# Patient Record
Sex: MALE | Race: WHITE | NOT HISPANIC OR LATINO | URBAN - METROPOLITAN AREA
[De-identification: names, ages, dates, MRNs, and addresses within clinical notes are randomized per-mention and may not be internally consistent; named-entity substitution may affect disease eponyms.]

---

## 2020-07-25 ENCOUNTER — TELEPHONE ENCOUNTER (OUTPATIENT)
Dept: URBAN - METROPOLITAN AREA CLINIC 13 | Facility: CLINIC | Age: 85
End: 2020-07-25

## 2020-07-26 ENCOUNTER — TELEPHONE ENCOUNTER (OUTPATIENT)
Dept: URBAN - METROPOLITAN AREA CLINIC 13 | Facility: CLINIC | Age: 85
End: 2020-07-26

## 2020-07-26 RX ORDER — SULFAMETHOXAZOLE AND TRIMETHOPRIM 800; 160 MG/1; MG/1
TABLET ORAL
Qty: 20 | Refills: 0 | Status: ACTIVE | COMMUNITY
Start: 2019-04-15

## 2020-07-26 RX ORDER — FOLIC ACID 1 MG/1
TAKE 1 TABLET BY MOUTH EVERY DAY TABLET ORAL
Qty: 30 | Refills: 0 | Status: ACTIVE | COMMUNITY
Start: 2018-09-10

## 2020-07-26 RX ORDER — ATORVASTATIN CALCIUM 20 MG/1
TAKE 1 TABLET AT BEDTIME TABLET, FILM COATED ORAL
Refills: 0 | Status: ACTIVE | COMMUNITY
Start: 2019-01-06

## 2020-07-26 RX ORDER — TRAMADOL HYDROCHLORIDE 50 MG/1
TAKE 1 TABLET BY MOUTH EVERY 6 HOURS AS NEEDED FOR PAIN TABLET ORAL
Qty: 20 | Refills: 0 | Status: ACTIVE | COMMUNITY
Start: 2019-04-25

## 2020-07-26 RX ORDER — OXYCODONE AND ACETAMINOPHEN 10; 325 MG/1; MG/1
TABLET ORAL
Qty: 60 | Refills: 0 | Status: ACTIVE | COMMUNITY
Start: 2019-05-01

## 2020-07-26 RX ORDER — PANTOPRAZOLE SODIUM 40 MG/1
TAKE ONE TABLET BY MOUTH TWICE A DAY TABLET, DELAYED RELEASE ORAL
Qty: 60 | Refills: 1 | Status: ACTIVE | COMMUNITY
Start: 2019-06-21

## 2020-07-26 RX ORDER — METHYLPREDNISOLONE 4 MG/1
TABLET ORAL
Qty: 21 | Refills: 0 | Status: ACTIVE | COMMUNITY
Start: 2019-04-26

## 2020-07-26 RX ORDER — OXYCODONE HYDROCHLORIDE 5 MG/1
TABLET ORAL
Qty: 12 | Refills: 0 | Status: ACTIVE | COMMUNITY
Start: 2019-04-26

## 2020-07-26 RX ORDER — ALLOPURINOL 300 MG/1
TAKE 1 TABLET DAILY TABLET ORAL
Refills: 0 | Status: ACTIVE | COMMUNITY
Start: 2019-04-03

## 2020-07-26 RX ORDER — POTASSIUM CHLORIDE 750 MG/1
TAKE 1 TABLET BY MOUTH EVERY DAY TABLET, FILM COATED, EXTENDED RELEASE ORAL
Qty: 30 | Refills: 0 | Status: ACTIVE | COMMUNITY
Start: 2018-10-02

## 2020-07-26 RX ORDER — FUROSEMIDE 40 MG/1
TAKE 1 TABLET BY MOUTH EVERY DAY TABLET ORAL
Qty: 90 | Refills: 0 | Status: ACTIVE | COMMUNITY
Start: 2018-12-10

## 2020-07-26 RX ORDER — MUPIROCIN 20 MG/G
PPLY TO BOTH NOSTRILS 3 TIMES A DAY OINTMENT TOPICAL
Qty: 22 | Refills: 0 | Status: ACTIVE | COMMUNITY
Start: 2018-10-30

## 2020-07-26 RX ORDER — LOSARTAN POTASSIUM 50 MG/1
TAKE 1 TABLET DAILY TABLET, FILM COATED ORAL
Refills: 0 | Status: ACTIVE | COMMUNITY
Start: 2019-02-06

## 2020-07-26 RX ORDER — HYDROCODONE BITARTRATE AND ACETAMINOPHEN 5; 325 MG/1; MG/1
TAKE ONE TABLET EVERY 6 HOURS AS NEEDED TABLET ORAL
Qty: 15 | Refills: 0 | Status: ACTIVE | COMMUNITY
Start: 2018-10-23

## 2020-07-26 RX ORDER — CLOTRIMAZOLE AND BETAMETHASONE DIPROPIONATE 10; .5 MG/G; MG/G
APPLY TO AFFECTED AREA TWICE A DAY FOR 2 WEEKS CREAM TOPICAL
Qty: 30 | Refills: 0 | Status: ACTIVE | COMMUNITY
Start: 2018-10-12

## 2020-07-26 RX ORDER — POTASSIUM CHLORIDE 750 MG/1
TAKE 1 TABLET BY MOUTH EVERY DAY TABLET, FILM COATED, EXTENDED RELEASE ORAL
Qty: 90 | Refills: 0 | Status: ACTIVE | COMMUNITY
Start: 2018-12-10

## 2020-07-26 RX ORDER — NITROGLYCERIN 0.4 MG/1
PLACE 1 TABLET UNDER THE TONGUE EVERY 5 MINUTES FOR UP TO 3 DOSES AS NEEDED FOR CHEST PAIN.CALL 911 IF PAIN PERSISTS TABLET SUBLINGUAL
Refills: 0 | Status: ACTIVE | COMMUNITY
Start: 2019-05-02

## 2020-07-26 RX ORDER — CLINDAMYCIN HYDROCHLORIDE 300 MG/1
TAKE ONE CAPSULE BY MOUTH 3 TIMES A DAY FOR 7 DAYS CAPSULE ORAL
Qty: 20 | Refills: 0 | Status: ACTIVE | COMMUNITY
Start: 2018-10-30

## 2020-07-26 RX ORDER — AMOXICILLIN 500 MG/1
TAKE ONE CAPSULE EVERY 12 HOURS FOR 10 DAYS CAPSULE ORAL
Qty: 20 | Refills: 0 | Status: ACTIVE | COMMUNITY
Start: 2018-07-16

## 2020-07-26 RX ORDER — CYCLOBENZAPRINE HYDROCHLORIDE 10 MG/1
TAKE 1 TABLET BY MOUTH 3 TIMES A DAY AS NEEDED FOR MUSCLE SPASMS TABLET, FILM COATED ORAL
Qty: 15 | Refills: 0 | Status: ACTIVE | COMMUNITY
Start: 2019-04-26

## 2020-07-26 RX ORDER — FUROSEMIDE 40 MG/1
TAKE 1 TABLET BY MOUTH EVERY DAY TABLET ORAL
Qty: 30 | Refills: 0 | Status: ACTIVE | COMMUNITY
Start: 2018-10-02

## 2020-07-26 RX ORDER — HYDROCODONE BITARTRATE AND ACETAMINOPHEN 10; 325 MG/1; MG/1
TABLET ORAL
Qty: 30 | Refills: 0 | Status: ACTIVE | COMMUNITY
Start: 2019-05-24

## 2020-07-26 RX ORDER — CARVEDILOL 6.25 MG/1
TAKE 1 TABLET TWICE DAILY WITH MEALS TABLET, FILM COATED ORAL
Refills: 0 | Status: ACTIVE | COMMUNITY
Start: 2019-02-06

## 2020-08-12 ENCOUNTER — TELEPHONE ENCOUNTER (OUTPATIENT)
Dept: URBAN - METROPOLITAN AREA CLINIC 113 | Facility: CLINIC | Age: 85
End: 2020-08-12

## 2020-08-15 ENCOUNTER — LAB OUTSIDE AN ENCOUNTER (OUTPATIENT)
Dept: URBAN - METROPOLITAN AREA CLINIC 113 | Facility: CLINIC | Age: 85
End: 2020-08-15

## 2020-08-18 ENCOUNTER — OFFICE VISIT (OUTPATIENT)
Dept: URBAN - METROPOLITAN AREA MEDICAL CENTER 2 | Facility: MEDICAL CENTER | Age: 85
End: 2020-08-18
Payer: MEDICARE

## 2020-08-18 ENCOUNTER — TELEPHONE ENCOUNTER (OUTPATIENT)
Dept: URBAN - METROPOLITAN AREA CLINIC 113 | Facility: CLINIC | Age: 85
End: 2020-08-18

## 2020-08-18 DIAGNOSIS — K22.2 ESOPHAGEAL OBSTRUCTION: ICD-10-CM

## 2020-08-18 DIAGNOSIS — K44.9 HIATAL HERNIA: ICD-10-CM

## 2020-08-18 PROCEDURE — 43248 EGD GUIDE WIRE INSERTION: CPT | Performed by: INTERNAL MEDICINE

## 2020-08-18 RX ORDER — FUROSEMIDE 40 MG/1
TAKE 1 TABLET BY MOUTH EVERY DAY TABLET ORAL
Qty: 30 | Refills: 0 | Status: ACTIVE | COMMUNITY
Start: 2018-10-02

## 2020-08-18 RX ORDER — HYDROCODONE BITARTRATE AND ACETAMINOPHEN 10; 325 MG/1; MG/1
TABLET ORAL
Qty: 30 | Refills: 0 | Status: ACTIVE | COMMUNITY
Start: 2019-05-24

## 2020-08-18 RX ORDER — MUPIROCIN 20 MG/G
PPLY TO BOTH NOSTRILS 3 TIMES A DAY OINTMENT TOPICAL
Qty: 22 | Refills: 0 | Status: ACTIVE | COMMUNITY
Start: 2018-10-30

## 2020-08-18 RX ORDER — METHYLPREDNISOLONE 4 MG/1
TABLET ORAL
Qty: 21 | Refills: 0 | Status: ACTIVE | COMMUNITY
Start: 2019-04-26

## 2020-08-18 RX ORDER — CYCLOBENZAPRINE HYDROCHLORIDE 10 MG/1
TAKE 1 TABLET BY MOUTH 3 TIMES A DAY AS NEEDED FOR MUSCLE SPASMS TABLET, FILM COATED ORAL
Qty: 15 | Refills: 0 | Status: ACTIVE | COMMUNITY
Start: 2019-04-26

## 2020-08-18 RX ORDER — POTASSIUM CHLORIDE 750 MG/1
TAKE 1 TABLET BY MOUTH EVERY DAY TABLET, FILM COATED, EXTENDED RELEASE ORAL
Qty: 30 | Refills: 0 | Status: ACTIVE | COMMUNITY
Start: 2018-10-02

## 2020-08-18 RX ORDER — LOSARTAN POTASSIUM 50 MG/1
TAKE 1 TABLET DAILY TABLET, FILM COATED ORAL
Refills: 0 | Status: ACTIVE | COMMUNITY
Start: 2019-02-06

## 2020-08-18 RX ORDER — TRAMADOL HYDROCHLORIDE 50 MG/1
TAKE 1 TABLET BY MOUTH EVERY 6 HOURS AS NEEDED FOR PAIN TABLET ORAL
Qty: 20 | Refills: 0 | Status: ACTIVE | COMMUNITY
Start: 2019-04-25

## 2020-08-18 RX ORDER — HYDROCODONE BITARTRATE AND ACETAMINOPHEN 5; 325 MG/1; MG/1
TAKE ONE TABLET EVERY 6 HOURS AS NEEDED TABLET ORAL
Qty: 15 | Refills: 0 | Status: ACTIVE | COMMUNITY
Start: 2018-10-23

## 2020-08-18 RX ORDER — CARVEDILOL 6.25 MG/1
TAKE 1 TABLET TWICE DAILY WITH MEALS TABLET, FILM COATED ORAL
Refills: 0 | Status: ACTIVE | COMMUNITY
Start: 2019-02-06

## 2020-08-18 RX ORDER — NITROGLYCERIN 0.4 MG/1
PLACE 1 TABLET UNDER THE TONGUE EVERY 5 MINUTES FOR UP TO 3 DOSES AS NEEDED FOR CHEST PAIN.CALL 911 IF PAIN PERSISTS TABLET SUBLINGUAL
Refills: 0 | Status: ACTIVE | COMMUNITY
Start: 2019-05-02

## 2020-08-18 RX ORDER — CLINDAMYCIN HYDROCHLORIDE 300 MG/1
TAKE ONE CAPSULE BY MOUTH 3 TIMES A DAY FOR 7 DAYS CAPSULE ORAL
Qty: 20 | Refills: 0 | Status: ACTIVE | COMMUNITY
Start: 2018-10-30

## 2020-08-18 RX ORDER — OXYCODONE AND ACETAMINOPHEN 10; 325 MG/1; MG/1
TABLET ORAL
Qty: 60 | Refills: 0 | Status: ACTIVE | COMMUNITY
Start: 2019-05-01

## 2020-08-18 RX ORDER — AMOXICILLIN 500 MG/1
TAKE ONE CAPSULE EVERY 12 HOURS FOR 10 DAYS CAPSULE ORAL
Qty: 20 | Refills: 0 | Status: ACTIVE | COMMUNITY
Start: 2018-07-16

## 2020-08-18 RX ORDER — OXYCODONE HYDROCHLORIDE 5 MG/1
TABLET ORAL
Qty: 12 | Refills: 0 | Status: ACTIVE | COMMUNITY
Start: 2019-04-26

## 2020-08-18 RX ORDER — SULFAMETHOXAZOLE AND TRIMETHOPRIM 800; 160 MG/1; MG/1
TABLET ORAL
Qty: 20 | Refills: 0 | Status: ACTIVE | COMMUNITY
Start: 2019-04-15

## 2020-08-18 RX ORDER — PANTOPRAZOLE SODIUM 40 MG/1
TAKE ONE TABLET BY MOUTH TWICE A DAY TABLET, DELAYED RELEASE ORAL
Qty: 60 | Refills: 1 | Status: ACTIVE | COMMUNITY
Start: 2019-06-21

## 2020-08-18 RX ORDER — ALLOPURINOL 300 MG/1
TAKE 1 TABLET DAILY TABLET ORAL
Refills: 0 | Status: ACTIVE | COMMUNITY
Start: 2019-04-03

## 2020-08-18 RX ORDER — FOLIC ACID 1 MG/1
TAKE 1 TABLET BY MOUTH EVERY DAY TABLET ORAL
Qty: 30 | Refills: 0 | Status: ACTIVE | COMMUNITY
Start: 2018-09-10

## 2020-08-18 RX ORDER — CLOTRIMAZOLE AND BETAMETHASONE DIPROPIONATE 10; .5 MG/G; MG/G
APPLY TO AFFECTED AREA TWICE A DAY FOR 2 WEEKS CREAM TOPICAL
Qty: 30 | Refills: 0 | Status: ACTIVE | COMMUNITY
Start: 2018-10-12

## 2020-08-18 RX ORDER — ATORVASTATIN CALCIUM 20 MG/1
TAKE 1 TABLET AT BEDTIME TABLET, FILM COATED ORAL
Refills: 0 | Status: ACTIVE | COMMUNITY
Start: 2019-01-06

## 2020-08-24 ENCOUNTER — TELEPHONE ENCOUNTER (OUTPATIENT)
Dept: URBAN - METROPOLITAN AREA CLINIC 113 | Facility: CLINIC | Age: 85
End: 2020-08-24

## 2020-11-17 ENCOUNTER — OFFICE VISIT (OUTPATIENT)
Dept: URBAN - METROPOLITAN AREA CLINIC 113 | Facility: CLINIC | Age: 85
End: 2020-11-17

## 2020-12-14 ENCOUNTER — TELEPHONE ENCOUNTER (OUTPATIENT)
Dept: URBAN - METROPOLITAN AREA CLINIC 113 | Facility: CLINIC | Age: 85
End: 2020-12-14

## 2020-12-15 ENCOUNTER — LAB OUTSIDE AN ENCOUNTER (OUTPATIENT)
Dept: URBAN - METROPOLITAN AREA CLINIC 113 | Facility: CLINIC | Age: 85
End: 2020-12-15

## 2020-12-21 ENCOUNTER — OFFICE VISIT (OUTPATIENT)
Dept: URBAN - METROPOLITAN AREA MEDICAL CENTER 19 | Facility: MEDICAL CENTER | Age: 85
End: 2020-12-21
Payer: MEDICARE

## 2020-12-21 DIAGNOSIS — K22.2 BENIGN ESOPHAGEAL STRICTURE: ICD-10-CM

## 2020-12-21 DIAGNOSIS — K44.9 DIAPHRAGMATIC HERNIA: ICD-10-CM

## 2020-12-21 DIAGNOSIS — R13.10 ABNORMAL SWALLOWING: ICD-10-CM

## 2020-12-21 PROCEDURE — 43248 EGD GUIDE WIRE INSERTION: CPT | Performed by: INTERNAL MEDICINE

## 2020-12-21 RX ORDER — AMOXICILLIN 500 MG/1
TAKE ONE CAPSULE EVERY 12 HOURS FOR 10 DAYS CAPSULE ORAL
Qty: 20 | Refills: 0 | Status: ACTIVE | COMMUNITY
Start: 2018-07-16

## 2020-12-21 RX ORDER — TRAMADOL HYDROCHLORIDE 50 MG/1
TAKE 1 TABLET BY MOUTH EVERY 6 HOURS AS NEEDED FOR PAIN TABLET ORAL
Qty: 20 | Refills: 0 | Status: ACTIVE | COMMUNITY
Start: 2019-04-25

## 2020-12-21 RX ORDER — POTASSIUM CHLORIDE 750 MG/1
TAKE 1 TABLET BY MOUTH EVERY DAY TABLET, FILM COATED, EXTENDED RELEASE ORAL
Qty: 30 | Refills: 0 | Status: ACTIVE | COMMUNITY
Start: 2018-10-02

## 2020-12-21 RX ORDER — HYDROCODONE BITARTRATE AND ACETAMINOPHEN 10; 325 MG/1; MG/1
TABLET ORAL
Qty: 30 | Refills: 0 | Status: ACTIVE | COMMUNITY
Start: 2019-05-24

## 2020-12-21 RX ORDER — MUPIROCIN 20 MG/G
PPLY TO BOTH NOSTRILS 3 TIMES A DAY OINTMENT TOPICAL
Qty: 22 | Refills: 0 | Status: ACTIVE | COMMUNITY
Start: 2018-10-30

## 2020-12-21 RX ORDER — LOSARTAN POTASSIUM 50 MG/1
TAKE 1 TABLET DAILY TABLET, FILM COATED ORAL
Refills: 0 | Status: ACTIVE | COMMUNITY
Start: 2019-02-06

## 2020-12-21 RX ORDER — FUROSEMIDE 40 MG/1
TAKE 1 TABLET BY MOUTH EVERY DAY TABLET ORAL
Qty: 30 | Refills: 0 | Status: ACTIVE | COMMUNITY
Start: 2018-10-02

## 2020-12-21 RX ORDER — METHYLPREDNISOLONE 4 MG/1
TABLET ORAL
Qty: 21 | Refills: 0 | Status: ACTIVE | COMMUNITY
Start: 2019-04-26

## 2020-12-21 RX ORDER — CLOTRIMAZOLE AND BETAMETHASONE DIPROPIONATE 10; .5 MG/G; MG/G
APPLY TO AFFECTED AREA TWICE A DAY FOR 2 WEEKS CREAM TOPICAL
Qty: 30 | Refills: 0 | Status: ACTIVE | COMMUNITY
Start: 2018-10-12

## 2020-12-21 RX ORDER — ATORVASTATIN CALCIUM 20 MG/1
TAKE 1 TABLET AT BEDTIME TABLET, FILM COATED ORAL
Refills: 0 | Status: ACTIVE | COMMUNITY
Start: 2019-01-06

## 2020-12-21 RX ORDER — SULFAMETHOXAZOLE AND TRIMETHOPRIM 800; 160 MG/1; MG/1
TABLET ORAL
Qty: 20 | Refills: 0 | Status: ACTIVE | COMMUNITY
Start: 2019-04-15

## 2020-12-21 RX ORDER — HYDROCODONE BITARTRATE AND ACETAMINOPHEN 5; 325 MG/1; MG/1
TAKE ONE TABLET EVERY 6 HOURS AS NEEDED TABLET ORAL
Qty: 15 | Refills: 0 | Status: ACTIVE | COMMUNITY
Start: 2018-10-23

## 2020-12-21 RX ORDER — NITROGLYCERIN 0.4 MG/1
PLACE 1 TABLET UNDER THE TONGUE EVERY 5 MINUTES FOR UP TO 3 DOSES AS NEEDED FOR CHEST PAIN.CALL 911 IF PAIN PERSISTS TABLET SUBLINGUAL
Refills: 0 | Status: ACTIVE | COMMUNITY
Start: 2019-05-02

## 2020-12-21 RX ORDER — OXYCODONE HYDROCHLORIDE 5 MG/1
TABLET ORAL
Qty: 12 | Refills: 0 | Status: ACTIVE | COMMUNITY
Start: 2019-04-26

## 2020-12-21 RX ORDER — PANTOPRAZOLE SODIUM 40 MG/1
1 TABLET TABLET, DELAYED RELEASE ORAL
Qty: 60 | Refills: 3 | OUTPATIENT
Start: 2020-12-21

## 2020-12-21 RX ORDER — ALLOPURINOL 300 MG/1
TAKE 1 TABLET DAILY TABLET ORAL
Refills: 0 | Status: ACTIVE | COMMUNITY
Start: 2019-04-03

## 2020-12-21 RX ORDER — OXYCODONE AND ACETAMINOPHEN 10; 325 MG/1; MG/1
TABLET ORAL
Qty: 60 | Refills: 0 | Status: ACTIVE | COMMUNITY
Start: 2019-05-01

## 2020-12-21 RX ORDER — FOLIC ACID 1 MG/1
TAKE 1 TABLET BY MOUTH EVERY DAY TABLET ORAL
Qty: 30 | Refills: 0 | Status: ACTIVE | COMMUNITY
Start: 2018-09-10

## 2020-12-21 RX ORDER — CLINDAMYCIN HYDROCHLORIDE 300 MG/1
TAKE ONE CAPSULE BY MOUTH 3 TIMES A DAY FOR 7 DAYS CAPSULE ORAL
Qty: 20 | Refills: 0 | Status: ACTIVE | COMMUNITY
Start: 2018-10-30

## 2020-12-21 RX ORDER — PANTOPRAZOLE SODIUM 40 MG/1
TAKE ONE TABLET BY MOUTH TWICE A DAY TABLET, DELAYED RELEASE ORAL
Qty: 60 | Refills: 1 | Status: ACTIVE | COMMUNITY
Start: 2019-06-21

## 2020-12-21 RX ORDER — CYCLOBENZAPRINE HYDROCHLORIDE 10 MG/1
TAKE 1 TABLET BY MOUTH 3 TIMES A DAY AS NEEDED FOR MUSCLE SPASMS TABLET, FILM COATED ORAL
Qty: 15 | Refills: 0 | Status: ACTIVE | COMMUNITY
Start: 2019-04-26

## 2020-12-21 RX ORDER — CARVEDILOL 6.25 MG/1
TAKE 1 TABLET TWICE DAILY WITH MEALS TABLET, FILM COATED ORAL
Refills: 0 | Status: ACTIVE | COMMUNITY
Start: 2019-02-06

## 2020-12-27 ENCOUNTER — TELEPHONE ENCOUNTER (OUTPATIENT)
Dept: URBAN - METROPOLITAN AREA CLINIC 113 | Facility: CLINIC | Age: 85
End: 2020-12-27

## 2021-02-03 ENCOUNTER — OFFICE VISIT (OUTPATIENT)
Dept: URBAN - METROPOLITAN AREA CLINIC 113 | Facility: CLINIC | Age: 86
End: 2021-02-03
Payer: MEDICARE

## 2021-02-03 VITALS
HEART RATE: 68 BPM | BODY MASS INDEX: 27.99 KG/M2 | RESPIRATION RATE: 18 BRPM | DIASTOLIC BLOOD PRESSURE: 74 MMHG | TEMPERATURE: 98.2 F | HEIGHT: 69 IN | WEIGHT: 189 LBS | SYSTOLIC BLOOD PRESSURE: 138 MMHG

## 2021-02-03 DIAGNOSIS — K22.2 BENIGN ESOPHAGEAL STRICTURE: ICD-10-CM

## 2021-02-03 DIAGNOSIS — R93.5 ABNORMAL CT OF THE ABDOMEN: ICD-10-CM

## 2021-02-03 DIAGNOSIS — R13.10 ESOPHAGEAL DYSPHAGIA: ICD-10-CM

## 2021-02-03 PROCEDURE — G8420 CALC BMI NORM PARAMETERS: HCPCS | Performed by: INTERNAL MEDICINE

## 2021-02-03 PROCEDURE — 99213 OFFICE O/P EST LOW 20 MIN: CPT | Performed by: INTERNAL MEDICINE

## 2021-02-03 PROCEDURE — G8482 FLU IMMUNIZE ORDER/ADMIN: HCPCS | Performed by: INTERNAL MEDICINE

## 2021-02-03 PROCEDURE — G8427 DOCREV CUR MEDS BY ELIG CLIN: HCPCS | Performed by: INTERNAL MEDICINE

## 2021-02-03 PROCEDURE — G9903 PT SCRN TBCO ID AS NON USER: HCPCS | Performed by: INTERNAL MEDICINE

## 2021-02-03 RX ORDER — FOLIC ACID 1 MG/1
TAKE 1 TABLET BY MOUTH EVERY DAY TABLET ORAL
Qty: 30 | Refills: 0 | Status: ACTIVE | COMMUNITY
Start: 2018-09-10

## 2021-02-03 RX ORDER — CYCLOBENZAPRINE HYDROCHLORIDE 10 MG/1
TAKE 1 TABLET BY MOUTH 3 TIMES A DAY AS NEEDED FOR MUSCLE SPASMS TABLET, FILM COATED ORAL
Qty: 15 | Refills: 0 | Status: ACTIVE | COMMUNITY
Start: 2019-04-26

## 2021-02-03 RX ORDER — POTASSIUM CHLORIDE 750 MG/1
TAKE 1 TABLET BY MOUTH EVERY DAY TABLET, FILM COATED, EXTENDED RELEASE ORAL
Qty: 30 | Refills: 0 | Status: ACTIVE | COMMUNITY
Start: 2018-10-02

## 2021-02-03 RX ORDER — PANTOPRAZOLE SODIUM 40 MG/1
1 TABLET TABLET, DELAYED RELEASE ORAL ONCE A DAY
Qty: 30 TABLET | Refills: 11
Start: 2020-12-21

## 2021-02-03 RX ORDER — LOSARTAN POTASSIUM 50 MG/1
TAKE 1 TABLET DAILY TABLET, FILM COATED ORAL
Refills: 0 | Status: ACTIVE | COMMUNITY
Start: 2019-02-06

## 2021-02-03 RX ORDER — PANTOPRAZOLE SODIUM 40 MG/1
1 TABLET TABLET, DELAYED RELEASE ORAL
Qty: 60 | Refills: 3 | Status: ACTIVE | COMMUNITY
Start: 2020-12-21

## 2021-02-03 RX ORDER — ALLOPURINOL 300 MG/1
TAKE 1 TABLET DAILY TABLET ORAL
Refills: 0 | Status: ACTIVE | COMMUNITY
Start: 2019-04-03

## 2021-02-03 RX ORDER — CARVEDILOL 6.25 MG/1
TAKE 1 TABLET TWICE DAILY WITH MEALS TABLET, FILM COATED ORAL
Refills: 0 | Status: ACTIVE | COMMUNITY
Start: 2019-02-06

## 2021-02-03 RX ORDER — NITROGLYCERIN 0.4 MG/1
PLACE 1 TABLET UNDER THE TONGUE EVERY 5 MINUTES FOR UP TO 3 DOSES AS NEEDED FOR CHEST PAIN.CALL 911 IF PAIN PERSISTS TABLET SUBLINGUAL
Refills: 0 | Status: ACTIVE | COMMUNITY
Start: 2019-05-02

## 2021-02-03 RX ORDER — ATORVASTATIN CALCIUM 20 MG/1
TAKE 1 TABLET AT BEDTIME TABLET, FILM COATED ORAL
Refills: 0 | Status: ACTIVE | COMMUNITY
Start: 2019-01-06

## 2021-02-03 RX ORDER — FUROSEMIDE 40 MG/1
TAKE 1 TABLET BY MOUTH EVERY DAY TABLET ORAL
Qty: 30 | Refills: 0 | Status: ACTIVE | COMMUNITY
Start: 2018-10-02

## 2021-02-03 NOTE — HPI-TODAY'S VISIT:
Mr. Ortega is a 85-year-old gentleman with a history of coronary artery disease status post MI and PTCA with stent presenting for follow-up regarding GERD and esophageal dysphagia status post EGD with distal esophageal stricture dilation.  He underwent an EGD on 12/21/2020 at [facility] there was notable for a 3 cm hiatal hernia and a 9 mm benign stricture at the gastroesophageal junction status post serial Savary dilation to 14 mm with the expected mucosal disruption and clean, normal stomach and normal duodenum.  He reports that his swallowing has significantly improved after the dilation.  He continues to take pantoprazole 40 mg once daily.  He denies any NSAID use, abdominal pain, nausea, vomiting.  His weight has been stable.    He had a CT abdomen pelvis with IV contrast on 11/24/2020 that reported a thick-walled urinary bladder possibly due to cystitis or under distention, marked sigmoid and descending colon diverticulosis without diverticulitis, similar appearance of multiple bilateral renal cysts, unchanged ectasia of the infrarenal abdominal aorta measuring 2.2 cm, marked atherosclerotic vascular changes, degenerative changes of the spine from L3-S1.  The CT abdomen pelvis with contrast was done in response to CT abdomen pelvis without contrast for evaluation of right groin pain on 11/23/20 that reported dilation of the right common femoral vein with adjacent inflammatory changes possibly due to deep vein thrombosis, possible mass versus stool in the cecum, marked colon diverticulosis without diverticulitis, prominent right inguinal lymph node, disc bulge at L5/S1, bilateral renal cysts, mild emphysematous changes at the lung bases.

## 2021-05-14 ENCOUNTER — LAB OUTSIDE AN ENCOUNTER (OUTPATIENT)
Dept: URBAN - METROPOLITAN AREA CLINIC 113 | Facility: CLINIC | Age: 86
End: 2021-05-14

## 2021-05-14 ENCOUNTER — TELEPHONE ENCOUNTER (OUTPATIENT)
Dept: URBAN - METROPOLITAN AREA CLINIC 113 | Facility: CLINIC | Age: 86
End: 2021-05-14

## 2021-05-26 ENCOUNTER — OFFICE VISIT (OUTPATIENT)
Dept: URBAN - METROPOLITAN AREA SURGERY CENTER 25 | Facility: SURGERY CENTER | Age: 86
End: 2021-05-26
Payer: MEDICARE

## 2021-05-26 DIAGNOSIS — Z12.11 COLON CANCER SCREENING: ICD-10-CM

## 2021-05-26 PROCEDURE — 996 AG2 (NON BILLABLE): Performed by: INTERNAL MEDICINE

## 2021-05-26 RX ORDER — PANTOPRAZOLE SODIUM 40 MG/1
1 TABLET TABLET, DELAYED RELEASE ORAL ONCE A DAY
Qty: 30 TABLET | Refills: 11 | Status: ACTIVE | COMMUNITY
Start: 2020-12-21

## 2021-05-26 RX ORDER — ALLOPURINOL 300 MG/1
TAKE 1 TABLET DAILY TABLET ORAL
Refills: 0 | Status: ACTIVE | COMMUNITY
Start: 2019-04-03

## 2021-05-26 RX ORDER — FOLIC ACID 1 MG/1
TAKE 1 TABLET BY MOUTH EVERY DAY TABLET ORAL
Qty: 30 | Refills: 0 | Status: ACTIVE | COMMUNITY
Start: 2018-09-10

## 2021-05-26 RX ORDER — ATORVASTATIN CALCIUM 20 MG/1
TAKE 1 TABLET AT BEDTIME TABLET, FILM COATED ORAL
Refills: 0 | Status: ACTIVE | COMMUNITY
Start: 2019-01-06

## 2021-05-26 RX ORDER — CYCLOBENZAPRINE HYDROCHLORIDE 10 MG/1
TAKE 1 TABLET BY MOUTH 3 TIMES A DAY AS NEEDED FOR MUSCLE SPASMS TABLET, FILM COATED ORAL
Qty: 15 | Refills: 0 | Status: ACTIVE | COMMUNITY
Start: 2019-04-26

## 2021-05-26 RX ORDER — LOSARTAN POTASSIUM 50 MG/1
TAKE 1 TABLET DAILY TABLET, FILM COATED ORAL
Refills: 0 | Status: ACTIVE | COMMUNITY
Start: 2019-02-06

## 2021-05-26 RX ORDER — FUROSEMIDE 40 MG/1
TAKE 1 TABLET BY MOUTH EVERY DAY TABLET ORAL
Qty: 30 | Refills: 0 | Status: ACTIVE | COMMUNITY
Start: 2018-10-02

## 2021-05-26 RX ORDER — CARVEDILOL 6.25 MG/1
TAKE 1 TABLET TWICE DAILY WITH MEALS TABLET, FILM COATED ORAL
Refills: 0 | Status: ACTIVE | COMMUNITY
Start: 2019-02-06

## 2021-05-26 RX ORDER — NITROGLYCERIN 0.4 MG/1
PLACE 1 TABLET UNDER THE TONGUE EVERY 5 MINUTES FOR UP TO 3 DOSES AS NEEDED FOR CHEST PAIN.CALL 911 IF PAIN PERSISTS TABLET SUBLINGUAL
Refills: 0 | Status: ACTIVE | COMMUNITY
Start: 2019-05-02

## 2021-05-26 RX ORDER — POTASSIUM CHLORIDE 750 MG/1
TAKE 1 TABLET BY MOUTH EVERY DAY TABLET, FILM COATED, EXTENDED RELEASE ORAL
Qty: 30 | Refills: 0 | Status: ACTIVE | COMMUNITY
Start: 2018-10-02

## 2021-05-27 ENCOUNTER — OFFICE VISIT (OUTPATIENT)
Dept: URBAN - METROPOLITAN AREA SURGERY CENTER 25 | Facility: SURGERY CENTER | Age: 86
End: 2021-05-27
Payer: MEDICARE

## 2021-05-27 DIAGNOSIS — K22.2 ESOPHAGEAL STENOSIS: ICD-10-CM

## 2021-05-27 DIAGNOSIS — K31.89 ACQUIRED DEFORMITY OF DUODENUM: ICD-10-CM

## 2021-05-27 PROCEDURE — 43239 EGD BIOPSY SINGLE/MULTIPLE: CPT | Performed by: INTERNAL MEDICINE

## 2021-05-27 PROCEDURE — G8907 PT DOC NO EVENTS ON DISCHARG: HCPCS | Performed by: INTERNAL MEDICINE

## 2021-05-27 RX ORDER — CYCLOBENZAPRINE HYDROCHLORIDE 10 MG/1
TAKE 1 TABLET BY MOUTH 3 TIMES A DAY AS NEEDED FOR MUSCLE SPASMS TABLET, FILM COATED ORAL
Qty: 15 | Refills: 0 | Status: ACTIVE | COMMUNITY
Start: 2019-04-26

## 2021-05-27 RX ORDER — PANTOPRAZOLE SODIUM 40 MG/1
1 TABLET TABLET, DELAYED RELEASE ORAL ONCE A DAY
Qty: 30 TABLET | Refills: 11 | Status: ACTIVE | COMMUNITY
Start: 2020-12-21

## 2021-05-27 RX ORDER — LOSARTAN POTASSIUM 50 MG/1
TAKE 1 TABLET DAILY TABLET, FILM COATED ORAL
Refills: 0 | Status: ACTIVE | COMMUNITY
Start: 2019-02-06

## 2021-05-27 RX ORDER — ALLOPURINOL 300 MG/1
TAKE 1 TABLET DAILY TABLET ORAL
Refills: 0 | Status: ACTIVE | COMMUNITY
Start: 2019-04-03

## 2021-05-27 RX ORDER — FOLIC ACID 1 MG/1
TAKE 1 TABLET BY MOUTH EVERY DAY TABLET ORAL
Qty: 30 | Refills: 0 | Status: ACTIVE | COMMUNITY
Start: 2018-09-10

## 2021-05-27 RX ORDER — NITROGLYCERIN 0.4 MG/1
PLACE 1 TABLET UNDER THE TONGUE EVERY 5 MINUTES FOR UP TO 3 DOSES AS NEEDED FOR CHEST PAIN.CALL 911 IF PAIN PERSISTS TABLET SUBLINGUAL
Refills: 0 | Status: ACTIVE | COMMUNITY
Start: 2019-05-02

## 2021-05-27 RX ORDER — POTASSIUM CHLORIDE 750 MG/1
TAKE 1 TABLET BY MOUTH EVERY DAY TABLET, FILM COATED, EXTENDED RELEASE ORAL
Qty: 30 | Refills: 0 | Status: ACTIVE | COMMUNITY
Start: 2018-10-02

## 2021-05-27 RX ORDER — CARVEDILOL 6.25 MG/1
TAKE 1 TABLET TWICE DAILY WITH MEALS TABLET, FILM COATED ORAL
Refills: 0 | Status: ACTIVE | COMMUNITY
Start: 2019-02-06

## 2021-05-27 RX ORDER — ATORVASTATIN CALCIUM 20 MG/1
TAKE 1 TABLET AT BEDTIME TABLET, FILM COATED ORAL
Refills: 0 | Status: ACTIVE | COMMUNITY
Start: 2019-01-06

## 2021-05-27 RX ORDER — FUROSEMIDE 40 MG/1
TAKE 1 TABLET BY MOUTH EVERY DAY TABLET ORAL
Qty: 30 | Refills: 0 | Status: ACTIVE | COMMUNITY
Start: 2018-10-02

## 2021-06-14 ENCOUNTER — WEB ENCOUNTER (OUTPATIENT)
Dept: URBAN - METROPOLITAN AREA CLINIC 113 | Facility: CLINIC | Age: 86
End: 2021-06-14

## 2021-06-14 ENCOUNTER — OFFICE VISIT (OUTPATIENT)
Dept: URBAN - METROPOLITAN AREA CLINIC 113 | Facility: CLINIC | Age: 86
End: 2021-06-14
Payer: MEDICARE

## 2021-06-14 ENCOUNTER — DASHBOARD ENCOUNTERS (OUTPATIENT)
Age: 86
End: 2021-06-14

## 2021-06-14 VITALS
HEIGHT: 69 IN | SYSTOLIC BLOOD PRESSURE: 155 MMHG | BODY MASS INDEX: 28.29 KG/M2 | WEIGHT: 191 LBS | DIASTOLIC BLOOD PRESSURE: 83 MMHG | RESPIRATION RATE: 20 BRPM | HEART RATE: 68 BPM | TEMPERATURE: 97.1 F

## 2021-06-14 DIAGNOSIS — R93.5 ABNORMAL CT OF THE ABDOMEN: ICD-10-CM

## 2021-06-14 DIAGNOSIS — Z98.890 OTHER SPECIFIED POSTPROCEDURAL STATES: ICD-10-CM

## 2021-06-14 DIAGNOSIS — R13.10 ESOPHAGEAL DYSPHAGIA: ICD-10-CM

## 2021-06-14 DIAGNOSIS — K22.2 BENIGN ESOPHAGEAL STRICTURE: ICD-10-CM

## 2021-06-14 DIAGNOSIS — Z87.19 PERSONAL HISTORY OF OTHER DISEASES OF THE DIGESTIVE SYSTEM: ICD-10-CM

## 2021-06-14 PROBLEM — 15634181000119107: Status: ACTIVE | Noted: 2021-06-06

## 2021-06-14 PROBLEM — 736726000: Status: ACTIVE | Noted: 2020-12-21

## 2021-06-14 PROBLEM — 40890009: Status: ACTIVE | Noted: 2020-08-15

## 2021-06-14 PROBLEM — 161615003: Status: ACTIVE | Noted: 2020-12-21

## 2021-06-14 PROBLEM — 235616003: Status: ACTIVE | Noted: 2020-08-15

## 2021-06-14 PROCEDURE — 99212 OFFICE O/P EST SF 10 MIN: CPT | Performed by: INTERNAL MEDICINE

## 2021-06-14 RX ORDER — FOLIC ACID 1 MG/1
TAKE 1 TABLET BY MOUTH EVERY DAY TABLET ORAL
Qty: 30 | Refills: 0 | Status: ACTIVE | COMMUNITY
Start: 2018-09-10

## 2021-06-14 RX ORDER — FUROSEMIDE 40 MG/1
TAKE 1 TABLET BY MOUTH EVERY DAY TABLET ORAL
Qty: 30 | Refills: 0 | Status: ACTIVE | COMMUNITY
Start: 2018-10-02

## 2021-06-14 RX ORDER — NITROGLYCERIN 0.4 MG/1
PLACE 1 TABLET UNDER THE TONGUE EVERY 5 MINUTES FOR UP TO 3 DOSES AS NEEDED FOR CHEST PAIN.CALL 911 IF PAIN PERSISTS TABLET SUBLINGUAL
Refills: 0 | Status: ACTIVE | COMMUNITY
Start: 2019-05-02

## 2021-06-14 RX ORDER — CYCLOBENZAPRINE HYDROCHLORIDE 10 MG/1
TAKE 1 TABLET BY MOUTH 3 TIMES A DAY AS NEEDED FOR MUSCLE SPASMS TABLET, FILM COATED ORAL
Qty: 15 | Refills: 0 | Status: ACTIVE | COMMUNITY
Start: 2019-04-26

## 2021-06-14 RX ORDER — ATORVASTATIN CALCIUM 20 MG/1
TAKE 1 TABLET AT BEDTIME TABLET, FILM COATED ORAL
Refills: 0 | Status: ACTIVE | COMMUNITY
Start: 2019-01-06

## 2021-06-14 RX ORDER — PANTOPRAZOLE SODIUM 40 MG/1
1 TABLET TABLET, DELAYED RELEASE ORAL ONCE A DAY
Qty: 30 TABLET | Refills: 11 | Status: ACTIVE | COMMUNITY
Start: 2020-12-21

## 2021-06-14 RX ORDER — LOSARTAN POTASSIUM 50 MG/1
TAKE 1 TABLET DAILY TABLET, FILM COATED ORAL
Refills: 0 | Status: ACTIVE | COMMUNITY
Start: 2019-02-06

## 2021-06-14 RX ORDER — ALLOPURINOL 300 MG/1
TAKE 1 TABLET DAILY TABLET ORAL
Refills: 0 | Status: ACTIVE | COMMUNITY
Start: 2019-04-03

## 2021-06-14 RX ORDER — CARVEDILOL 6.25 MG/1
TAKE 1 TABLET TWICE DAILY WITH MEALS TABLET, FILM COATED ORAL
Refills: 0 | Status: ACTIVE | COMMUNITY
Start: 2019-02-06

## 2021-06-14 RX ORDER — POTASSIUM CHLORIDE 750 MG/1
TAKE 1 TABLET BY MOUTH EVERY DAY TABLET, FILM COATED, EXTENDED RELEASE ORAL
Qty: 30 | Refills: 0 | Status: ACTIVE | COMMUNITY
Start: 2018-10-02

## 2021-06-14 NOTE — HPI-TODAY'S VISIT:
Mr. Ortega is an 85-year-old gentleman with history of coronary artery disease status post MI and PTCA with stent, presenting for follow up after EGD with dilation. He was last seen in this office on 2/3/2021 for follow-up regarding GERD and esophageal dysphagia status post EGD with distal esophageal stricture dilation.  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  He was to continue pantoprazole 40 mg daily indefinitely.  Repeat EGD with dilation was discussed pending clinical course he had a CT scan which revealed questionable cecal mass versus heavy stool burden.  Repeat CT abdomen pelvis with contrast did not appreciate any cecal abnormalities.  Patient called the office on 5/14/2021 to have repeat EGD with till completed. EGD (5/27/2021): Benign-appearing esophageal stenosis, dilated to 14 mm with Savary dilator.  2 cm hiatal hernia.  Mild nonerosive gastropathy.  Normal duodenum.  He reports significant improvements in his swallowing after dilation. His acid reflux symptoms are doing well on pantoprazole 40 mg once daily. He denies regurgitation. There are no other GI complaints. He is having 1-2 nonbloody bowel movements daily. No abdominal pain, nausea, vomiting, constipation or diarrhea. His weight has been stable.  He does report complaints of a "black" pruritic mole on his back. He has a follow up with Dr. Weiner to further evaluate this.

## 2023-10-02 NOTE — PHYSICAL EXAM GASTROINTESTINAL
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  Onset  - Date of onset:  Several years  Chart reviewed at time of initial evaluation (relevant co-morbidities, allergies, tests and medications listed):  - Diagnostic tests reviewed: X-Ray  hypertension      SHEN Art is a pleasant, 75 year-old male who is here for prehab in anticipation of a left total shoulder arthroplasty. He is right-hand dominant. He is enjoys sail-boating and is part of [employer]. He does report that he enjoys walking, traveling. Reports right knee OA that does limit the amount of walking he can do. Notes that he did participate in physical therapy last year due to left shoulder pain and neck pain; reports that his neck pain improved significantly, however shoulder was essentially unchanged. He saw Dr. Gonzales, who recommended a left TSA.  \"It's a constant, dull pain.\" Reports that he will take tylenol or meloxicam as needed for pain.     Pain / Symptoms  - Pain/symptom is: constant  - Pain rating (out of 10): Current: 2 ; Best: 1; Worst: 7  - Location: Left shoulder  - Quality / Description: ache, discomfort  - Alleviating Factors: avoiding movement in involved area, over-the-counter medication    Function:   Limitations / Exacerbation Factors:   - Patient reports pain and difficulty with function reported below.  - bed mobility, lower body dressing, meal/food prep, upper body dressing,  sleep disturbed, grocery shopping, house/yard work, opening doors, standing tasks, sitting tasks, pushing/pulling, reaching and lifting/carrying  Prior Level of Function: declining function, therefore referred to therapy,  Personal Occupations Profile Affected: bathing/showering, lower body dressing, upper body dressing, home establishment/managements, meal preparation/cleanup, sleep participation, social participation family, social participation friend, social participation peer     Patient Goals: decreased pain, increased motion, increased strength, return to sport/leisure activities and independence with ADLs/IADLs. Plans to take extended road-trip in February of 2024.    Prior treatment  - outpatient PT  - Discharged from hospital, home health, or skilled nursing facility in last 30 days: no  Home Environment   - Patient lives with: significant other  - Type of home: condominium (lives on 2nd floor, railing is on right side going up.)  - Bathroom setup: walk-in shower  - Assistance available: consistent  - Denies 2 or more falls or an unexplained fall with injury in the last year.  - Feel safe at home / work / school: yes     OBJECTIVE                                                                                                                     Range of Motion (ROM)   (degrees unless noted; active unless noted; norms in ( ); negative=lacking to 0, positive=beyond 0)  Shoulder:    - Flexion (180):        • Left:95  Pain         • Right: 175    - Abduction (180):        • Left: 95 pain        • Right: 180    - Internal Rotation:        - Behind Back:           • Right: Mid-back    - External Rotation:       - at 0°:            • Right: 80                     Outcome/Assessments  Outcome Measures:   Quick Disabilities of the Arm, Shoulder and Hand: QuickDash Total Score (Score will not calculate if more then 2 questions are left blank): 45.45  (scored 0-100; a higher score indicates greater disability) see  flowsheet for additional documentation      Treatment     Therapeutic Exercise  Discussed exercises that will be completed post-operatively    Activities of Daily Living/Self Care  Discussed MD protocol and post-op care, use of sling, and progression of rehab.  Patient educated on adaptive dressing, sleeping, and functional mobility strategies that will be initiated post-operatively. Discussed use of loose-fitting clothing with zippers/buttons.    Skilled input: verbal instruction/cues and tactile instruction/cues    Writer verbally educated and received verbal consent for hand placement, positioning of patient, and techniques to be performed today from patient       ASSESSMENT                                                                                                          75 year old patient has reported functional limitations listed above impacted by signs and symptoms consistent with treatment diagnosis below.  Treatment Diagnosis:   - Involved: left shoulder.  - Symptoms/impairments: increased pain/symptoms, impaired range of motion, impaired muscle length/flexibility, impaired joint play/mobility, impaired strength, impaired activity tolerance and impaired body mechanics.  Pain/symptoms after session (out of 10): 1    Prognosis: Patient will benefit from skilled therapy.  Rehabilitative potential is: good.  Clinical decision making: Low - Patient has few limitations (1-3), comorbidities and/or complexities, as noted in problem focused assessment noted above, that impact their occupational profile.  Resulting in few treatment options and no task modification consistent with low clinical decision making complexity.    Education:   - Present and ready to learn: patient  - Results of above outlined education: Verbalizes understanding and Demonstrates understanding    PLAN                                                                                                                         The following  skilled interventions to be implemented to achieve goals listed below:  Therapeutic Exercise (97079)  Therapeutic Activity (26740)  Activities of Daily Living/Self Care (90124)    Frequency / Duration  1 times per week tapering as patient progresses for 2 weeks for an estimated total of 2 visits    Patient involved in and agreed to plan of care and goals.  Patient given attendance policy at time of initial evaluation.    Suggestions for next session as indicated: Progress per plan of care, discuss post-op care, trial post-op exercises      Goals  Long Term Goals: to be met by end of plan of care   1. Patient will be independent with instructed task modification.  2. Patient and/or caregiver will verbalize understanding of precautions and wearing of sling.  3. Patient will verbalize strategies for modified independent upper body dressing without reported difficulty.   4. Patient will verbalize understanding of independent with progressed and modified home exercise program for post-op.      Therapy procedure time and total treatment time can be found documented on the Time Entry flowsheet